# Patient Record
Sex: FEMALE | Race: BLACK OR AFRICAN AMERICAN | NOT HISPANIC OR LATINO | URBAN - METROPOLITAN AREA
[De-identification: names, ages, dates, MRNs, and addresses within clinical notes are randomized per-mention and may not be internally consistent; named-entity substitution may affect disease eponyms.]

---

## 2024-03-05 ENCOUNTER — NURSING HOME VISIT (OUTPATIENT)
Dept: POST ACUTE CARE | Facility: EXTERNAL LOCATION | Age: 89
End: 2024-03-05
Payer: MEDICARE

## 2024-03-05 DIAGNOSIS — F20.0 PARANOID SCHIZOPHRENIA (MULTI): Primary | ICD-10-CM

## 2024-03-05 DIAGNOSIS — I10 PRIMARY HYPERTENSION: ICD-10-CM

## 2024-03-05 DIAGNOSIS — I50.32 CHRONIC DIASTOLIC HEART FAILURE (MULTI): ICD-10-CM

## 2024-03-05 PROCEDURE — 99308 SBSQ NF CARE LOW MDM 20: CPT | Performed by: INTERNAL MEDICINE

## 2024-03-05 NOTE — LETTER
Patient: Sangeetha Mai  : 1935    Encounter Date: 2024    Physician progress note  Seen at [facility]  Chief complaint follow-up chronic pain, schizophrenia, dementia, diastolic heart failure  Interval events  Disenrolled from [employer] in February  Mental illness Now followed by [facility] -  started on zoloft in   Issue with lower dentures to see facility dental  Nursing concerns none  Current medications excluding as needed  Amlodipine 2.5  Incruse Ellipta  Lisinopril 10  Aspirin 81  Nasacort  Catapres transdermal  Valproic acid 250 twice daily  Omega-3  Risperdal 1 mg twice daily  Gabapentin 100 nightly  Zoloft 20 mg  Voltaren arthritis gel    Review of Systems   Constitutional:  Positive for fatigue. Negative for activity change and appetite change.   HENT:  Negative for congestion, hearing loss, mouth sores, sore throat, trouble swallowing and voice change.    Eyes:  Negative for visual disturbance.   Respiratory:  Negative for cough, choking, shortness of breath and wheezing.    Cardiovascular:  Positive for leg swelling. Negative for chest pain and palpitations.   Gastrointestinal:  Negative for abdominal pain, constipation and diarrhea.   Endocrine: Positive for cold intolerance.   Genitourinary:  Negative for difficulty urinating and hematuria.   Musculoskeletal:  Positive for arthralgias, back pain, gait problem and joint swelling.   Neurological:  Positive for weakness. Negative for dizziness, syncope and headaches.   Hematological:  Does not bruise/bleed easily.   Psychiatric/Behavioral:  Positive for dysphoric mood. Negative for confusion and sleep disturbance. The patient is not nervous/anxious.        Weight 227 pounds stable over the past year  Blood pressure reviewed systolic usually between 120 and 135  Pulse stable between 70 and 85  Physical Exam  Constitutional:       Appearance: Normal appearance. She is obese.      Comments: Black female. Sitting in  "wheelchair at side of bed. She is clean and without order but oddly dressed.  Wearing sunglasses, hat, numerous necklaces and scarf, rifling through papers at bedside table at time of visit   HENT:      Head: Normocephalic.      Mouth/Throat:      Mouth: Mucous membranes are moist.      Pharynx: Oropharynx is clear.   Eyes:      Extraocular Movements: Extraocular movements intact.      Pupils: Pupils are equal, round, and reactive to light.   Cardiovascular:      Rate and Rhythm: Normal rate and regular rhythm.      Heart sounds: Normal heart sounds.   Pulmonary:      Effort: Pulmonary effort is normal.      Breath sounds: No wheezing.   Abdominal:      General: Bowel sounds are normal.      Palpations: Abdomen is soft.      Comments: Obese   Musculoskeletal:      Cervical back: Normal range of motion. No tenderness.      Right lower leg: No edema.      Left lower leg: No edema.      Comments: OA Changes to her MTPs diffusely. Limited ROM At both shoulders. Knees contracted. Nonambulatory.    Skin:     General: Skin is warm and dry.   Neurological:      Mental Status: She is alert.   Psychiatric:         Mood and Affect: Mood normal.         Speech: Speech is tangential.         Behavior: Behavior is cooperative.         Thought Content: Thought content is delusional.         Cognition and Memory: Cognition is impaired. Memory is impaired.      Comments: Denies hallucinations visual nor auditory. Fixed delusion that there is a fire under her bed. Attempts to address temperature in room, reassure her that there is nothing under her bed to no avail. Fixated. At baseline does not recall any medical problems. That the nurses are giving her medications that she does not need and \"are trending me black\" this these delusions are longstanding and have not changed with time nor changing any of her medications.  They do not seem to be causing her any distress nor cause a barrier to her receiving medical care  "             Labs  February 14, 2024  TSH 3.4  February 12, 2024  Valproic acid 21.3  WBC 8  Hemoglobin 9.8  Platelet 274  Glucose 63  Creatinine 0.9  Sodium 145  Potassium 4.2  Albumin 3.3  LFTs normal  B12 greater than 2000  LDL 74  Hemoglobin A1c 5.2  Vitamin D 27      Assessment and plan  88-year-old with schizophrenia medically stable.  Appears at her baseline.    Pulmonary  1 COPD with chronic respiratory failure.  Stable with 24-hour oxygen.  No interval exacerbation.  Continue current medications    Cardiovascular  1 chronic diastolic heart failure clinically euvolemic.  Continue current medications Including lisinopril, aspirin, Catapres.  Monitor pulse  2 hypertension well-controlled with amlodipine lisinopril and Catapres continue    Neuropsych  1 schizophrenia with fixed delusions.  Schizophrenic symptoms have been stable for many years and treated with current dose respite all.  Will discuss further with psychiatry once relationship established.  Current delusions do not appear to be impairing her ability to be cared for at the nursing home.  Continue risperdal and valproic acid  #2 major depression stable with Zoloft.  Continue. Appreciate psychiatry input    Replace Vit D      DNR Comfort Care arrest  Nini Romero MD      Electronically Signed By: Nini Romero MD   3/10/24  5:11 PM

## 2024-03-10 PROBLEM — H52.03 HYPEROPIA OF BOTH EYES WITH ASTIGMATISM AND PRESBYOPIA: Status: ACTIVE | Noted: 2024-03-10

## 2024-03-10 PROBLEM — I51.89 DIASTOLIC DYSFUNCTION: Status: ACTIVE | Noted: 2024-03-10

## 2024-03-10 PROBLEM — H52.203 HYPEROPIA OF BOTH EYES WITH ASTIGMATISM AND PRESBYOPIA: Status: ACTIVE | Noted: 2024-03-10

## 2024-03-10 PROBLEM — H43.813 PVD (POSTERIOR VITREOUS DETACHMENT), BOTH EYES: Status: ACTIVE | Noted: 2024-03-10

## 2024-03-10 PROBLEM — R07.81 CHEST PAIN, PLEURITIC: Status: ACTIVE | Noted: 2024-03-10

## 2024-03-10 PROBLEM — I50.32 CHRONIC DIASTOLIC HEART FAILURE (MULTI): Status: ACTIVE | Noted: 2024-03-10

## 2024-03-10 PROBLEM — D64.9 ANEMIA: Status: ACTIVE | Noted: 2024-03-10

## 2024-03-10 PROBLEM — Z85.3 HISTORY OF BREAST CANCER: Status: ACTIVE | Noted: 2024-03-10

## 2024-03-10 PROBLEM — E55.9 VITAMIN D DEFICIENCY: Status: ACTIVE | Noted: 2024-03-10

## 2024-03-10 PROBLEM — I51.89 DIASTOLIC DYSFUNCTION: Status: RESOLVED | Noted: 2024-03-10 | Resolved: 2024-03-10

## 2024-03-10 PROBLEM — M15.9 GENERALIZED OSTEOARTHRITIS: Status: ACTIVE | Noted: 2024-03-10

## 2024-03-10 PROBLEM — H52.4 HYPEROPIA OF BOTH EYES WITH ASTIGMATISM AND PRESBYOPIA: Status: ACTIVE | Noted: 2024-03-10

## 2024-03-10 PROBLEM — R54 FRAIL ELDERLY: Status: ACTIVE | Noted: 2024-03-10

## 2024-03-10 PROBLEM — F20.9 SCHIZOPHRENIA (MULTI): Status: ACTIVE | Noted: 2024-03-10

## 2024-03-10 PROBLEM — M19.90 ARTHRITIS: Status: ACTIVE | Noted: 2024-03-10

## 2024-03-10 PROBLEM — I25.10 CAD (CORONARY ARTERY DISEASE): Status: ACTIVE | Noted: 2024-03-10

## 2024-03-10 PROBLEM — Z95.5 PRESENCE OF STENT IN CORONARY ARTERY: Status: ACTIVE | Noted: 2024-03-10

## 2024-03-10 PROBLEM — R73.03 PREDIABETES: Status: ACTIVE | Noted: 2024-03-10

## 2024-03-10 PROBLEM — I10 HYPERTENSION: Status: ACTIVE | Noted: 2024-03-10

## 2024-03-10 ASSESSMENT — ENCOUNTER SYMPTOMS
CONSTIPATION: 0
TROUBLE SWALLOWING: 0
HEMATURIA: 0
DIFFICULTY URINATING: 0
CONFUSION: 0
SLEEP DISTURBANCE: 0
DIZZINESS: 0
BRUISES/BLEEDS EASILY: 0
ARTHRALGIAS: 1
HEADACHES: 0
CHOKING: 0
APPETITE CHANGE: 0
DYSPHORIC MOOD: 1
NERVOUS/ANXIOUS: 0
PALPITATIONS: 0
ABDOMINAL PAIN: 0
BACK PAIN: 1
JOINT SWELLING: 1
SHORTNESS OF BREATH: 0
FATIGUE: 1
ACTIVITY CHANGE: 0
WHEEZING: 0
VOICE CHANGE: 0
WEAKNESS: 1
COUGH: 0
SORE THROAT: 0
DIARRHEA: 0

## 2024-03-10 NOTE — PROGRESS NOTES
Physician progress note  Seen at [facility]  Chief complaint follow-up chronic pain, schizophrenia, dementia, diastolic heart failure  Interval events  Disenrolled from [employer] in February  Mental illness Now followed by [facility] -  started on zoloft in Jan  Issue with lower dentures to see facility dental  Nursing concerns none  Current medications excluding as needed  Amlodipine 2.5  Incruse Ellipta  Lisinopril 10  Aspirin 81  Nasacort  Catapres transdermal  Valproic acid 250 twice daily  Omega-3  Risperdal 1 mg twice daily  Gabapentin 100 nightly  Zoloft 20 mg  Voltaren arthritis gel    Review of Systems   Constitutional:  Positive for fatigue. Negative for activity change and appetite change.   HENT:  Negative for congestion, hearing loss, mouth sores, sore throat, trouble swallowing and voice change.    Eyes:  Negative for visual disturbance.   Respiratory:  Negative for cough, choking, shortness of breath and wheezing.    Cardiovascular:  Positive for leg swelling. Negative for chest pain and palpitations.   Gastrointestinal:  Negative for abdominal pain, constipation and diarrhea.   Endocrine: Positive for cold intolerance.   Genitourinary:  Negative for difficulty urinating and hematuria.   Musculoskeletal:  Positive for arthralgias, back pain, gait problem and joint swelling.   Neurological:  Positive for weakness. Negative for dizziness, syncope and headaches.   Hematological:  Does not bruise/bleed easily.   Psychiatric/Behavioral:  Positive for dysphoric mood. Negative for confusion and sleep disturbance. The patient is not nervous/anxious.        Weight 227 pounds stable over the past year  Blood pressure reviewed systolic usually between 120 and 135  Pulse stable between 70 and 85  Physical Exam  Constitutional:       Appearance: Normal appearance. She is obese.      Comments: Black female. Sitting in wheelchair at side of bed. She is clean and without order but oddly  "dressed.  Wearing sunglasses, hat, numerous necklaces and scarf, rifling through papers at bedside table at time of visit   HENT:      Head: Normocephalic.      Mouth/Throat:      Mouth: Mucous membranes are moist.      Pharynx: Oropharynx is clear.   Eyes:      Extraocular Movements: Extraocular movements intact.      Pupils: Pupils are equal, round, and reactive to light.   Cardiovascular:      Rate and Rhythm: Normal rate and regular rhythm.      Heart sounds: Normal heart sounds.   Pulmonary:      Effort: Pulmonary effort is normal.      Breath sounds: No wheezing.   Abdominal:      General: Bowel sounds are normal.      Palpations: Abdomen is soft.      Comments: Obese   Musculoskeletal:      Cervical back: Normal range of motion. No tenderness.      Right lower leg: No edema.      Left lower leg: No edema.      Comments: OA Changes to her MTPs diffusely. Limited ROM At both shoulders. Knees contracted. Nonambulatory.    Skin:     General: Skin is warm and dry.   Neurological:      Mental Status: She is alert.   Psychiatric:         Mood and Affect: Mood normal.         Speech: Speech is tangential.         Behavior: Behavior is cooperative.         Thought Content: Thought content is delusional.         Cognition and Memory: Cognition is impaired. Memory is impaired.      Comments: Denies hallucinations visual nor auditory. Fixed delusion that there is a fire under her bed. Attempts to address temperature in room, reassure her that there is nothing under her bed to no avail. Fixated. At baseline does not recall any medical problems. That the nurses are giving her medications that she does not need and \"are trending me black\" this these delusions are longstanding and have not changed with time nor changing any of her medications.  They do not seem to be causing her any distress nor cause a barrier to her receiving medical care              Labs  February 14, 2024  TSH 3.4  February 12, 2024  Valproic acid " 21.3  WBC 8  Hemoglobin 9.8  Platelet 274  Glucose 63  Creatinine 0.9  Sodium 145  Potassium 4.2  Albumin 3.3  LFTs normal  B12 greater than 2000  LDL 74  Hemoglobin A1c 5.2  Vitamin D 27      Assessment and plan  88-year-old with schizophrenia medically stable.  Appears at her baseline.    Pulmonary  1 COPD with chronic respiratory failure.  Stable with 24-hour oxygen.  No interval exacerbation.  Continue current medications    Cardiovascular  1 chronic diastolic heart failure clinically euvolemic.  Continue current medications Including lisinopril, aspirin, Catapres.  Monitor pulse  2 hypertension well-controlled with amlodipine lisinopril and Catapres continue    Neuropsych  1 schizophrenia with fixed delusions.  Schizophrenic symptoms have been stable for many years and treated with current dose respite all.  Will discuss further with psychiatry once relationship established.  Current delusions do not appear to be impairing her ability to be cared for at the nursing home.  Continue risperdal and valproic acid  #2 major depression stable with Zoloft.  Continue. Appreciate psychiatry input    Replace Vit D      DNR Comfort Care arrest  Nini Romero MD